# Patient Record
Sex: FEMALE | Race: WHITE | NOT HISPANIC OR LATINO
[De-identification: names, ages, dates, MRNs, and addresses within clinical notes are randomized per-mention and may not be internally consistent; named-entity substitution may affect disease eponyms.]

---

## 2017-04-10 ENCOUNTER — TRANSCRIPTION ENCOUNTER (OUTPATIENT)
Age: 47
End: 2017-04-10

## 2017-09-17 ENCOUNTER — TRANSCRIPTION ENCOUNTER (OUTPATIENT)
Age: 47
End: 2017-09-17

## 2018-02-09 ENCOUNTER — TRANSCRIPTION ENCOUNTER (OUTPATIENT)
Age: 48
End: 2018-02-09

## 2018-02-16 ENCOUNTER — TRANSCRIPTION ENCOUNTER (OUTPATIENT)
Age: 48
End: 2018-02-16

## 2018-07-29 ENCOUNTER — TRANSCRIPTION ENCOUNTER (OUTPATIENT)
Age: 48
End: 2018-07-29

## 2019-06-18 ENCOUNTER — INPATIENT (INPATIENT)
Facility: HOSPITAL | Age: 49
LOS: 1 days | Discharge: HOME | DRG: 517 | End: 2019-06-20
Attending: SPECIALIST | Admitting: SPECIALIST
Payer: COMMERCIAL

## 2019-06-18 ENCOUNTER — TRANSCRIPTION ENCOUNTER (OUTPATIENT)
Age: 49
End: 2019-06-18

## 2019-06-18 VITALS
SYSTOLIC BLOOD PRESSURE: 122 MMHG | TEMPERATURE: 99 F | RESPIRATION RATE: 17 BRPM | DIASTOLIC BLOOD PRESSURE: 81 MMHG | OXYGEN SATURATION: 98 % | WEIGHT: 210.1 LBS | HEIGHT: 67 IN | HEART RATE: 70 BPM

## 2019-06-18 PROCEDURE — 99285 EMERGENCY DEPT VISIT HI MDM: CPT | Mod: 25

## 2019-06-19 DIAGNOSIS — E11.9 TYPE 2 DIABETES MELLITUS WITHOUT COMPLICATIONS: ICD-10-CM

## 2019-06-19 DIAGNOSIS — S82.002A UNSPECIFIED FRACTURE OF LEFT PATELLA, INITIAL ENCOUNTER FOR CLOSED FRACTURE: ICD-10-CM

## 2019-06-19 DIAGNOSIS — I10 ESSENTIAL (PRIMARY) HYPERTENSION: ICD-10-CM

## 2019-06-19 DIAGNOSIS — S52.124A NONDISPLACED FRACTURE OF HEAD OF RIGHT RADIUS, INITIAL ENCOUNTER FOR CLOSED FRACTURE: ICD-10-CM

## 2019-06-19 LAB
ANION GAP SERPL CALC-SCNC: 14 MMOL/L — SIGNIFICANT CHANGE UP (ref 5–17)
APTT BLD: 29.8 SEC — SIGNIFICANT CHANGE UP (ref 27.5–36.3)
BLD GP AB SCN SERPL QL: NEGATIVE — SIGNIFICANT CHANGE UP
BUN SERPL-MCNC: 13 MG/DL — SIGNIFICANT CHANGE UP (ref 7–23)
CALCIUM SERPL-MCNC: 9.5 MG/DL — SIGNIFICANT CHANGE UP (ref 8.4–10.5)
CHLORIDE SERPL-SCNC: 100 MMOL/L — SIGNIFICANT CHANGE UP (ref 96–108)
CO2 SERPL-SCNC: 23 MMOL/L — SIGNIFICANT CHANGE UP (ref 22–31)
CREAT SERPL-MCNC: 0.68 MG/DL — SIGNIFICANT CHANGE UP (ref 0.5–1.3)
GLUCOSE BLDC GLUCOMTR-MCNC: 110 MG/DL — HIGH (ref 70–99)
GLUCOSE BLDC GLUCOMTR-MCNC: 115 MG/DL — HIGH (ref 70–99)
GLUCOSE SERPL-MCNC: 125 MG/DL — HIGH (ref 70–99)
HCG SERPL-ACNC: <2 MIU/ML — SIGNIFICANT CHANGE UP
HCT VFR BLD CALC: 37.8 % — SIGNIFICANT CHANGE UP (ref 34.5–45)
HGB BLD-MCNC: 13 G/DL — SIGNIFICANT CHANGE UP (ref 11.5–15.5)
INR BLD: 1.06 RATIO — SIGNIFICANT CHANGE UP (ref 0.88–1.16)
MCHC RBC-ENTMCNC: 30.2 PG — SIGNIFICANT CHANGE UP (ref 27–34)
MCHC RBC-ENTMCNC: 34.5 GM/DL — SIGNIFICANT CHANGE UP (ref 32–36)
MCV RBC AUTO: 87.6 FL — SIGNIFICANT CHANGE UP (ref 80–100)
PLATELET # BLD AUTO: 436 K/UL — HIGH (ref 150–400)
POTASSIUM SERPL-MCNC: 3.9 MMOL/L — SIGNIFICANT CHANGE UP (ref 3.5–5.3)
POTASSIUM SERPL-MCNC: 5.6 MMOL/L — HIGH (ref 3.5–5.3)
POTASSIUM SERPL-SCNC: 3.9 MMOL/L — SIGNIFICANT CHANGE UP (ref 3.5–5.3)
POTASSIUM SERPL-SCNC: 5.6 MMOL/L — HIGH (ref 3.5–5.3)
PROTHROM AB SERPL-ACNC: 12.2 SEC — SIGNIFICANT CHANGE UP (ref 10–12.9)
RBC # BLD: 4.32 M/UL — SIGNIFICANT CHANGE UP (ref 3.8–5.2)
RBC # FLD: 12.2 % — SIGNIFICANT CHANGE UP (ref 10.3–14.5)
RH IG SCN BLD-IMP: POSITIVE — SIGNIFICANT CHANGE UP
RH IG SCN BLD-IMP: POSITIVE — SIGNIFICANT CHANGE UP
SODIUM SERPL-SCNC: 137 MMOL/L — SIGNIFICANT CHANGE UP (ref 135–145)
WBC # BLD: 12.2 K/UL — HIGH (ref 3.8–10.5)
WBC # FLD AUTO: 12.2 K/UL — HIGH (ref 3.8–10.5)

## 2019-06-19 PROCEDURE — 73080 X-RAY EXAM OF ELBOW: CPT | Mod: 26,RT

## 2019-06-19 PROCEDURE — 73200 CT UPPER EXTREMITY W/O DYE: CPT | Mod: 26,RT

## 2019-06-19 PROCEDURE — 27524 TREAT KNEECAP FRACTURE: CPT | Mod: LT

## 2019-06-19 PROCEDURE — 73564 X-RAY EXAM KNEE 4 OR MORE: CPT | Mod: 26,LT

## 2019-06-19 PROCEDURE — 76377 3D RENDER W/INTRP POSTPROCES: CPT | Mod: 26

## 2019-06-19 PROCEDURE — 73110 X-RAY EXAM OF WRIST: CPT | Mod: 26,RT

## 2019-06-19 PROCEDURE — 24650 CLTX RDL HEAD/NCK FX WO MNPJ: CPT | Mod: 59,RT

## 2019-06-19 PROCEDURE — 93010 ELECTROCARDIOGRAM REPORT: CPT

## 2019-06-19 PROCEDURE — 71045 X-RAY EXAM CHEST 1 VIEW: CPT | Mod: 26

## 2019-06-19 RX ORDER — RAMIPRIL 5 MG
1 CAPSULE ORAL
Qty: 0 | Refills: 0 | DISCHARGE

## 2019-06-19 RX ORDER — LISINOPRIL 2.5 MG/1
20 TABLET ORAL DAILY
Refills: 0 | Status: DISCONTINUED | OUTPATIENT
Start: 2019-06-19 | End: 2019-06-20

## 2019-06-19 RX ORDER — ASPIRIN/CALCIUM CARB/MAGNESIUM 324 MG
81 TABLET ORAL DAILY
Refills: 0 | Status: DISCONTINUED | OUTPATIENT
Start: 2019-06-19 | End: 2019-06-20

## 2019-06-19 RX ORDER — OXYCODONE HYDROCHLORIDE 5 MG/1
5 TABLET ORAL
Refills: 0 | Status: DISCONTINUED | OUTPATIENT
Start: 2019-06-19 | End: 2019-06-20

## 2019-06-19 RX ORDER — DEXTROSE 50 % IN WATER 50 %
15 SYRINGE (ML) INTRAVENOUS ONCE
Refills: 0 | Status: DISCONTINUED | OUTPATIENT
Start: 2019-06-19 | End: 2019-06-20

## 2019-06-19 RX ORDER — CHLORHEXIDINE GLUCONATE 213 G/1000ML
1 SOLUTION TOPICAL ONCE
Refills: 0 | Status: COMPLETED | OUTPATIENT
Start: 2019-06-19 | End: 2019-06-19

## 2019-06-19 RX ORDER — OXYCODONE HYDROCHLORIDE 5 MG/1
5 TABLET ORAL EVERY 4 HOURS
Refills: 0 | Status: DISCONTINUED | OUTPATIENT
Start: 2019-06-19 | End: 2019-06-20

## 2019-06-19 RX ORDER — HYDROMORPHONE HYDROCHLORIDE 2 MG/ML
1 INJECTION INTRAMUSCULAR; INTRAVENOUS; SUBCUTANEOUS EVERY 4 HOURS
Refills: 0 | Status: DISCONTINUED | OUTPATIENT
Start: 2019-06-19 | End: 2019-06-20

## 2019-06-19 RX ORDER — DEXTROSE 50 % IN WATER 50 %
25 SYRINGE (ML) INTRAVENOUS ONCE
Refills: 0 | Status: DISCONTINUED | OUTPATIENT
Start: 2019-06-19 | End: 2019-06-20

## 2019-06-19 RX ORDER — OXYCODONE HYDROCHLORIDE 5 MG/1
10 TABLET ORAL EVERY 4 HOURS
Refills: 0 | Status: DISCONTINUED | OUTPATIENT
Start: 2019-06-19 | End: 2019-06-20

## 2019-06-19 RX ORDER — ACETAMINOPHEN 500 MG
975 TABLET ORAL ONCE
Refills: 0 | Status: COMPLETED | OUTPATIENT
Start: 2019-06-19 | End: 2019-06-19

## 2019-06-19 RX ORDER — GLUCAGON INJECTION, SOLUTION 0.5 MG/.1ML
1 INJECTION, SOLUTION SUBCUTANEOUS ONCE
Refills: 0 | Status: DISCONTINUED | OUTPATIENT
Start: 2019-06-19 | End: 2019-06-20

## 2019-06-19 RX ORDER — SODIUM CHLORIDE 9 MG/ML
1000 INJECTION INTRAMUSCULAR; INTRAVENOUS; SUBCUTANEOUS
Refills: 0 | Status: DISCONTINUED | OUTPATIENT
Start: 2019-06-19 | End: 2019-06-20

## 2019-06-19 RX ORDER — ATORVASTATIN CALCIUM 80 MG/1
1 TABLET, FILM COATED ORAL
Qty: 0 | Refills: 0 | DISCHARGE

## 2019-06-19 RX ORDER — DEXTROSE 50 % IN WATER 50 %
12.5 SYRINGE (ML) INTRAVENOUS ONCE
Refills: 0 | Status: DISCONTINUED | OUTPATIENT
Start: 2019-06-19 | End: 2019-06-20

## 2019-06-19 RX ORDER — OXYCODONE HYDROCHLORIDE 5 MG/1
1 TABLET ORAL
Qty: 12 | Refills: 0
Start: 2019-06-19 | End: 2019-06-21

## 2019-06-19 RX ORDER — CEFAZOLIN SODIUM 1 G
2000 VIAL (EA) INJECTION EVERY 8 HOURS
Refills: 0 | Status: COMPLETED | OUTPATIENT
Start: 2019-06-20 | End: 2019-06-20

## 2019-06-19 RX ORDER — INSULIN LISPRO 100/ML
VIAL (ML) SUBCUTANEOUS EVERY 6 HOURS
Refills: 0 | Status: DISCONTINUED | OUTPATIENT
Start: 2019-06-19 | End: 2019-06-20

## 2019-06-19 RX ORDER — ACETAMINOPHEN 500 MG
650 TABLET ORAL EVERY 6 HOURS
Refills: 0 | Status: DISCONTINUED | OUTPATIENT
Start: 2019-06-19 | End: 2019-06-20

## 2019-06-19 RX ORDER — ATORVASTATIN CALCIUM 80 MG/1
20 TABLET, FILM COATED ORAL AT BEDTIME
Refills: 0 | Status: DISCONTINUED | OUTPATIENT
Start: 2019-06-19 | End: 2019-06-20

## 2019-06-19 RX ORDER — ONDANSETRON 8 MG/1
4 TABLET, FILM COATED ORAL EVERY 6 HOURS
Refills: 0 | Status: DISCONTINUED | OUTPATIENT
Start: 2019-06-19 | End: 2019-06-19

## 2019-06-19 RX ORDER — SODIUM CHLORIDE 9 MG/ML
1000 INJECTION, SOLUTION INTRAVENOUS
Refills: 0 | Status: DISCONTINUED | OUTPATIENT
Start: 2019-06-19 | End: 2019-06-20

## 2019-06-19 RX ORDER — ENOXAPARIN SODIUM 100 MG/ML
40 INJECTION SUBCUTANEOUS DAILY
Refills: 0 | Status: DISCONTINUED | OUTPATIENT
Start: 2019-06-20 | End: 2019-06-20

## 2019-06-19 RX ORDER — ONDANSETRON 8 MG/1
4 TABLET, FILM COATED ORAL EVERY 6 HOURS
Refills: 0 | Status: DISCONTINUED | OUTPATIENT
Start: 2019-06-19 | End: 2019-06-20

## 2019-06-19 RX ORDER — IBUPROFEN 200 MG
600 TABLET ORAL ONCE
Refills: 0 | Status: COMPLETED | OUTPATIENT
Start: 2019-06-19 | End: 2019-06-19

## 2019-06-19 RX ORDER — HYDROMORPHONE HYDROCHLORIDE 2 MG/ML
0.5 INJECTION INTRAMUSCULAR; INTRAVENOUS; SUBCUTANEOUS EVERY 4 HOURS
Refills: 0 | Status: DISCONTINUED | OUTPATIENT
Start: 2019-06-19 | End: 2019-06-20

## 2019-06-19 RX ORDER — DOCUSATE SODIUM 100 MG
100 CAPSULE ORAL THREE TIMES A DAY
Refills: 0 | Status: DISCONTINUED | OUTPATIENT
Start: 2019-06-19 | End: 2019-06-20

## 2019-06-19 RX ADMIN — OXYCODONE HYDROCHLORIDE 10 MILLIGRAM(S): 5 TABLET ORAL at 15:04

## 2019-06-19 RX ADMIN — HYDROMORPHONE HYDROCHLORIDE 1 MILLIGRAM(S): 2 INJECTION INTRAMUSCULAR; INTRAVENOUS; SUBCUTANEOUS at 09:48

## 2019-06-19 RX ADMIN — CHLORHEXIDINE GLUCONATE 1 APPLICATION(S): 213 SOLUTION TOPICAL at 12:40

## 2019-06-19 RX ADMIN — SODIUM CHLORIDE 60 MILLILITER(S): 9 INJECTION INTRAMUSCULAR; INTRAVENOUS; SUBCUTANEOUS at 23:47

## 2019-06-19 RX ADMIN — ONDANSETRON 4 MILLIGRAM(S): 8 TABLET, FILM COATED ORAL at 18:39

## 2019-06-19 RX ADMIN — OXYCODONE HYDROCHLORIDE 10 MILLIGRAM(S): 5 TABLET ORAL at 14:23

## 2019-06-19 RX ADMIN — OXYCODONE HYDROCHLORIDE 10 MILLIGRAM(S): 5 TABLET ORAL at 19:36

## 2019-06-19 RX ADMIN — HYDROMORPHONE HYDROCHLORIDE 1 MILLIGRAM(S): 2 INJECTION INTRAMUSCULAR; INTRAVENOUS; SUBCUTANEOUS at 10:24

## 2019-06-19 RX ADMIN — OXYCODONE HYDROCHLORIDE 10 MILLIGRAM(S): 5 TABLET ORAL at 07:15

## 2019-06-19 RX ADMIN — OXYCODONE HYDROCHLORIDE 10 MILLIGRAM(S): 5 TABLET ORAL at 06:44

## 2019-06-19 RX ADMIN — HYDROMORPHONE HYDROCHLORIDE 1 MILLIGRAM(S): 2 INJECTION INTRAMUSCULAR; INTRAVENOUS; SUBCUTANEOUS at 16:14

## 2019-06-19 RX ADMIN — HYDROMORPHONE HYDROCHLORIDE 1 MILLIGRAM(S): 2 INJECTION INTRAMUSCULAR; INTRAVENOUS; SUBCUTANEOUS at 15:44

## 2019-06-19 RX ADMIN — Medication 81 MILLIGRAM(S): at 12:00

## 2019-06-19 RX ADMIN — OXYCODONE HYDROCHLORIDE 10 MILLIGRAM(S): 5 TABLET ORAL at 20:00

## 2019-06-19 RX ADMIN — SODIUM CHLORIDE 75 MILLILITER(S): 9 INJECTION, SOLUTION INTRAVENOUS at 23:49

## 2019-06-19 RX ADMIN — Medication 975 MILLIGRAM(S): at 03:33

## 2019-06-19 RX ADMIN — Medication 600 MILLIGRAM(S): at 01:25

## 2019-06-19 NOTE — CONSULT NOTE ADULT - SUBJECTIVE AND OBJECTIVE BOX
48 y.o. female history of DM coming in with right elbow pain and left knee pain after a mechanical trip and fall while leaving work 6/18/19 around 6pm.  Did not hit head, no neck or back pain.  Was initially able to bear weight but now unable to walk secondary to pain.  Took Tylenol at 6pm with some relief, any movement of the affected joints makes the pain worse.  History of an ACL repair on the left knee. Patient found to have a left patella fx and a right radius fx. Patient is scheduled for surgical repair of the left patella fx. Patient states pain is well controlled. Denies any hx of CP or shortness of breath.        PAST MEDICAL & SURGICAL HISTORY:  Hypertension  DM  HLD  ACL repair        MEDICATIONS  (STANDING):  aspirin enteric coated 81 milliGRAM(s) Oral daily  atorvastatin 20 milliGRAM(s) Oral at bedtime  chlorhexidine 4% Liquid 1 Application(s) Topical once  dextrose 5%. 1000 milliLiter(s) (50 mL/Hr) IV Continuous <Continuous>  dextrose 50% Injectable 12.5 Gram(s) IV Push once  dextrose 50% Injectable 25 Gram(s) IV Push once  dextrose 50% Injectable 25 Gram(s) IV Push once  insulin lispro (HumaLOG) corrective regimen sliding scale   SubCutaneous every 6 hours  lisinopril 20 milliGRAM(s) Oral daily  sodium chloride 0.9%. 1000 milliLiter(s) (60 mL/Hr) IV Continuous <Continuous>    MEDICATIONS  (PRN):  dextrose 40% Gel 15 Gram(s) Oral once PRN Blood Glucose LESS THAN 70 milliGRAM(s)/deciliter  glucagon  Injectable 1 milliGRAM(s) IntraMuscular once PRN Glucose LESS THAN 70 milligrams/deciliter  HYDROmorphone  Injectable 1 milliGRAM(s) IV Push every 4 hours PRN breakthru pain  oxyCODONE    IR 5 milliGRAM(s) Oral every 3 hours PRN Moderate Pain (4 - 6)  oxyCODONE    IR 10 milliGRAM(s) Oral every 4 hours PRN Severe Pain (7 - 10)    Social Hx:  Tobacco: neg  ETOH:neg  Drugs: neg    Family Hx:  As per my conversation with the patient, non contributory        CONSTITUTIONAL: No weakness, fevers or chills  EYES/ENT: No visual changes;  No vertigo or throat pain   NECK: No pain or stiffness  RESPIRATORY: No cough, wheezing, hemoptysis; No shortness of breath  CARDIOVASCULAR: No chest pain or palpitations  GASTROINTESTINAL: No abdominal or epigastric pain. No nausea, vomiting, or hematemesis; No diarrhea or constipation. No melena or hematochezia.  GENITOURINARY: No dysuria, frequency or hematuria  NEUROLOGICAL: No numbness or weakness  SKIN: No itching, burning, rashes, or lesions   MUSCULOSKELETAL: left leg and right arm pain      INTERVAL HPI/OVERNIGHT EVENTS:  T(C): 37 (06-19-19 @ 08:51), Max: 37 (06-18-19 @ 23:59)  HR: 66 (06-19-19 @ 08:51) (66 - 79)  BP: 132/76 (06-19-19 @ 08:51) (122/81 - 144/77)  RR: 17 (06-19-19 @ 08:51) (16 - 18)  SpO2: 97% (06-19-19 @ 08:51) (97% - 98%)  Wt(kg): --  I&O's Summary    18 Jun 2019 07:01  -  19 Jun 2019 07:00  --------------------------------------------------------  IN: 120 mL / OUT: 0 mL / NET: 120 mL        PHYSICAL EXAM:  GENERAL: NAD, well-groomed, well-developed  HEAD:  Atraumatic, Normocephalic  EYES: EOMI, PERRLA, conjunctiva and sclera clear  ENMT: No tonsillar erythema, exudates, or enlargement; Moist mucous membranes, Good dentition, No lesions  NECK: Supple, No JVD, Normal thyroid  NERVOUS SYSTEM:  Alert & Oriented X3, Good concentration; Motor Strength 5/5 B/L upper and lower extremities; DTRs 2+ intact and symmetric  CHEST/LUNG: Clear to percussion bilaterally; No rales, rhonchi, wheezing, or rubs  HEART: Regular rate and rhythm; No murmurs, rubs, or gallops  ABDOMEN: Soft, Nontender, Nondistended; Bowel sounds present  EXTREMITIES:  2+ Peripheral Pulses, No clubbing, cyanosis, or edema  LYMPH: No lymphadenopathy noted  SKIN: No rashes or lesions        LABS:                        13.0   12.2  )-----------( 436      ( 19 Jun 2019 05:25 )             37.8     06-19    x   |  x   |  x   ----------------------------<  x   3.9   |  x   |  x     Ca    9.5      19 Jun 2019 05:25      PT/INR - ( 19 Jun 2019 05:25 )   PT: 12.2 sec;   INR: 1.06 ratio         PTT - ( 19 Jun 2019 05:25 )  PTT:29.8 sec    CAPILLARY BLOOD GLUCOSE      POCT Blood Glucose.: 110 mg/dL (19 Jun 2019 06:38)    EKG NSR @ 70        Radiology reports:

## 2019-06-19 NOTE — H&P ADULT - ASSESSMENT
A/P: 48 year old woman presenting with L transverse patellar fracture with nonintact knee extensor mechanism and nondisplaced radial head fracture.  On discussion with the patient, she feels uncomfortable going home on crutches as she has to walk upstairs to get into her house and has young kids at home.        - admit to Dr. Jim, orthopedic surgery  - home meds  - pain control  - OR today with Dr. Jim for ORIF L patella  - NPO/IVFs  - preoperative labs

## 2019-06-19 NOTE — ED PROVIDER NOTE - MUSCULOSKELETAL, MLM
Right shoulder and wrist NT full ROM.  Right elbow NT but unable to fully extend.  Left hip and ankle NT full ROM.  Left knee + edema diffusely tender including over the patella.  Unable to passively flex secondary to pain

## 2019-06-19 NOTE — ED PROVIDER NOTE - PROGRESS NOTE DETAILS
xray reveals radial head fx with lucency prox ulna - and ant sail sign,, xr knee with patella fx --ortho paged for eval ELIAS Preciado: ortho at bedside awaiting recs

## 2019-06-19 NOTE — H&P ADULT - NSHPPHYSICALEXAM_GEN_ALL_CORE
EXAM:  Gen: NAD, alert and oriented x3  Resp: no increased WOB on RA    LLE:  Skin intact, no overlying ecchymosis/erythema, moderate swelling about knee  tenderness to palpation over anterior knee  SILT diffusely  TA/GS/EHL/FHL motor intact  Knee extensor mechanism non-intact  2+ PT/DP pulses    RLE:  no tenderness to palpation over femur/hip/knee/tibia/ankle  Full painless ROM of hip/knee/ankle    LUE:   no tenderness to palpation over humerus/shoulder/elbow/wrist  Full painless ROM about shoulder/elbow/wrist  radial pulse palpable    RUE:   Tenderness to palpation over radial head, ROM somewhat limited due to pain  no tenderness to palpation over olecranon, shoulder, wrist  radial pulse palpable

## 2019-06-19 NOTE — H&P ADULT - NSHPLABSRESULTS_GEN_ALL_CORE
Labs:               13.0   12.2  )-----------( 436      ( 19 Jun 2019 05:25 )             37.8     06-19    x   |  x   |  x   ----------------------------<  x   3.9   |  x   |  x     Ca    9.5      19 Jun 2019 05:2    Imaging:    < from: Xray Knee 4 Views, Left (06.19.19 @ 00:58) >    FINDINGS:     Acute comminuted minimally displaced patellar fracture. Large knee joint   effusion. Evidence of prior ACL reconstruction with orthopedic hardware   in the distal femur and proximal tibia. Mild medial compartment joint   spacenarrowing with tricompartmental osteophytosis.    IMPRESSION:     Acute comminuted minimally displaced patellar fracture. Large knee joint   effusion.     < from: Xray Elbow AP + Lateral + Oblique, Right (06.19.19 @ 01:21) >    IMPRESSION:    Acute minimally displaced sagittally oriented fracture through the   central aspect of the radial head. Associated elbow joint effusion.   Question nondisplaced fracture at the base of the coronoid process on the   oblique view.No other fractures. Joint spaces are maintained.    < end of copied text >

## 2019-06-19 NOTE — CONSULT NOTE ADULT - PROBLEM SELECTOR RECOMMENDATION 9
Patient scheduled for Open reduction and internal fixation  No contraindication to scheduled procedure  pain meds as needed  NPO for procedure

## 2019-06-19 NOTE — H&P ADULT - HISTORY OF PRESENT ILLNESS
HPI:   48y Female presenting for L knee and R elbow pain s/p mechanical fall while walking to work.  She denies head strike/LOC.  Localizes pain to the L knee and R elbow, no radiation of pain, no pain in other joints/extremities.

## 2019-06-19 NOTE — ED ADULT NURSE NOTE - OBJECTIVE STATEMENT
[Acute] : an acute visit [Cough] : cough 48 YOF A&Ox3 with pmh of DM presents to ED for right elbow and left knee pain due to a slip and fall earlier on Tuesday. Swelling noted to left knee. Patient denies hitting head or LOC. Patient unable to bear weight due to pain. patient denies sob, chest pain, n/v/d, headaches & blurry vision. safety maintained.

## 2019-06-19 NOTE — ED PROVIDER NOTE - CLINICAL SUMMARY MEDICAL DECISION MAKING FREE TEXT BOX
sherry - pt sp mechanical fall - outstreched hand and onto lt knee - ho acl tear w hardware - wrist nontender - rt elbow from no pain with supination pronation ttp med epicondyle - nvi, knee lachman neg, pos effusion lateral and med jt line ttp ext mech unable to be asses 2/2 to pain cannot exclude qtr - will place in knee immob and crutches /cane and fu ortho sherry - pt sp mechanical fall - outstretched hand and onto lt knee - ho acl tear w hardware - wrist nontender - rt elbow from no pain with supination pronation ttp med epicondyle - nvi, knee Lachman neg, pos effusion lateral and med jt line ttp ext mech unable to be asses 2/2 to pain cannot exclude qtr - will place in knee immob and crutches /cane and fu ortho

## 2019-06-19 NOTE — ED PROVIDER NOTE - NSFOLLOWUPINSTRUCTIONS_ED_ALL_ED_FT
1- Tylenol as needed for pain  2- Oxycodone 5 mg every 8 hours as needed for extreme pain, you can not drive while taking this medication.    3- Follow up with you orthopedist that did the procedure on your knee or Dr Jim  4- Return to ER for any new or worsening symptoms

## 2019-06-19 NOTE — ED PROVIDER NOTE - CARE PLAN
Principal Discharge DX:	Knee injury, initial encounter  Secondary Diagnosis:	Elbow injury, right, initial encounter Principal Discharge DX:	Closed nondisplaced fracture of left patella, unspecified fracture morphology, initial encounter  Secondary Diagnosis:	Elbow injury, right, initial encounter  Secondary Diagnosis:	Closed nondisplaced fracture of head of right radius, initial encounter

## 2019-06-19 NOTE — CONSULT NOTE ADULT - ASSESSMENT
47 yo woman s/p fall presents with a left patella fx and a right radial fx. Patient is scheduled for a Open reduction and internal fixation of the right patella.

## 2019-06-19 NOTE — ED PROVIDER NOTE - SECONDARY DIAGNOSIS.
Elbow injury, right, initial encounter Closed nondisplaced fracture of head of right radius, initial encounter

## 2019-06-19 NOTE — ED ADULT NURSE NOTE - CHPI ED NUR SYMPTOMS NEG
no loss of consciousness/no numbness/no tingling/no vomiting/no confusion/no bleeding/no abrasion/no weakness/no deformity/no fever

## 2019-06-19 NOTE — CHART NOTE - NSCHARTNOTEFT_GEN_A_CORE
Dx: L Patella Fx    Sx: ORIF     Surgeons:  Dr. Jim    Med Cleared: optimization pending eval by HLM    H&P: pending - seen by Dr. Prabhakar Carter (Ortho PGY1)    Vital Signs Last 24 Hrs  T(C): 37 (19 Jun 2019 08:51), Max: 37 (18 Jun 2019 23:59)  T(F): 98.6 (19 Jun 2019 08:51), Max: 98.6 (18 Jun 2019 23:59)  HR: 66 (19 Jun 2019 08:51) (66 - 79)  BP: 132/76 (19 Jun 2019 08:51) (122/81 - 144/77)  BP(mean): --  RR: 17 (19 Jun 2019 08:51) (16 - 18)  SpO2: 97% (19 Jun 2019 08:51) (97% - 98%)                          13.0   12.2  )-----------( 436      ( 19 Jun 2019 05:25 )             37.8   06-19    137  |  100  |  13  ----------------------------<  125<H>  5.6<H>   |  23  |  0.68    Ca    9.5      19 Jun 2019 05:25    PT/INR - ( 19 Jun 2019 05:25 )   PT: 12.2 sec;   INR: 1.06 ratio         PTT - ( 19 Jun 2019 05:25 )  PTT:29.8 sec    **Repeat K+ ordered    Type and Screen: done x2    UA:  ordered    HCG: ordered    EKG: ordered    CXR: ordered      47yo F w/ R Radial Head Fx, R Snuffbox tenderness possible Scaphoid Fx and L Patella Fx. To OR today for ORIF w/ Dr. Jim.     - Pain Control  - NWB LLE in BJKI  - NWB RUE in Sling and wrist splint  - DVT PPx: SCDs, hold all chemical PPx   - NPO except for meds  - IVF while NPO  - Medical Clearance per HLD  - Plan for OR today

## 2019-06-19 NOTE — ED PROVIDER NOTE - OBJECTIVE STATEMENT
48 y.o. female history of DM coming in with right elbow pain and left knee pain after a mechanical trip and fall while leaving work today around 6pm.  Did not hit head, no neck or back pain.  Was initially able to bear weight but now unable to walk secondary to pain.  Took Tylenol at 6pm with some relief, any movement of the affected joints makes the pain worse.  History of an ACL repair on the left knee.

## 2019-06-19 NOTE — ED PROVIDER NOTE - PRINCIPAL DIAGNOSIS
Knee injury, initial encounter Closed nondisplaced fracture of left patella, unspecified fracture morphology, initial encounter

## 2019-06-19 NOTE — ED ADULT NURSE NOTE - NSIMPLEMENTINTERV_GEN_ALL_ED
Implemented All Fall Risk Interventions:  [place] to call system. Call bell, personal items and telephone within reach. Instruct patient to call for assistance. Room bathroom lighting operational. Non-slip footwear when patient is off stretcher. Physically safe environment: no spills, clutter or unnecessary equipment. Stretcher in lowest position, wheels locked, appropriate side rails in place. Provide visual cue, wrist band, yellow gown, etc. Monitor gait and stability. Monitor for mental status changes and reorient to person, place, and time. Review medications for side effects contributing to fall risk. Reinforce activity limits and safety measures with patient and family.

## 2019-06-20 ENCOUNTER — TRANSCRIPTION ENCOUNTER (OUTPATIENT)
Age: 49
End: 2019-06-20

## 2019-06-20 VITALS
TEMPERATURE: 99 F | OXYGEN SATURATION: 98 % | SYSTOLIC BLOOD PRESSURE: 132 MMHG | DIASTOLIC BLOOD PRESSURE: 74 MMHG | RESPIRATION RATE: 18 BRPM | HEART RATE: 82 BPM

## 2019-06-20 LAB
ANION GAP SERPL CALC-SCNC: 14 MMOL/L — SIGNIFICANT CHANGE UP (ref 5–17)
APPEARANCE UR: ABNORMAL
BACTERIA # UR AUTO: NEGATIVE — SIGNIFICANT CHANGE UP
BILIRUB UR-MCNC: NEGATIVE — SIGNIFICANT CHANGE UP
BUN SERPL-MCNC: 9 MG/DL — SIGNIFICANT CHANGE UP (ref 7–23)
CALCIUM SERPL-MCNC: 8.7 MG/DL — SIGNIFICANT CHANGE UP (ref 8.4–10.5)
CHLORIDE SERPL-SCNC: 105 MMOL/L — SIGNIFICANT CHANGE UP (ref 96–108)
CO2 SERPL-SCNC: 22 MMOL/L — SIGNIFICANT CHANGE UP (ref 22–31)
COLOR SPEC: YELLOW — SIGNIFICANT CHANGE UP
CREAT SERPL-MCNC: 0.65 MG/DL — SIGNIFICANT CHANGE UP (ref 0.5–1.3)
DIFF PNL FLD: ABNORMAL
EPI CELLS # UR: 4 /HPF — SIGNIFICANT CHANGE UP (ref 0–5)
GLUCOSE BLDC GLUCOMTR-MCNC: 132 MG/DL — HIGH (ref 70–99)
GLUCOSE BLDC GLUCOMTR-MCNC: 155 MG/DL — HIGH (ref 70–99)
GLUCOSE BLDC GLUCOMTR-MCNC: 189 MG/DL — HIGH (ref 70–99)
GLUCOSE BLDC GLUCOMTR-MCNC: 189 MG/DL — HIGH (ref 70–99)
GLUCOSE SERPL-MCNC: 174 MG/DL — HIGH (ref 70–99)
GLUCOSE UR QL: NEGATIVE — SIGNIFICANT CHANGE UP
HBA1C BLD-MCNC: 5.8 % — HIGH (ref 4–5.6)
HCT VFR BLD CALC: 36 % — SIGNIFICANT CHANGE UP (ref 34.5–45)
HGB BLD-MCNC: 12.3 G/DL — SIGNIFICANT CHANGE UP (ref 11.5–15.5)
HYALINE CASTS # UR AUTO: 2 /LPF — SIGNIFICANT CHANGE UP (ref 0–7)
KETONES UR-MCNC: NEGATIVE — SIGNIFICANT CHANGE UP
LEUKOCYTE ESTERASE UR-ACNC: ABNORMAL
MCHC RBC-ENTMCNC: 30.2 PG — SIGNIFICANT CHANGE UP (ref 27–34)
MCHC RBC-ENTMCNC: 34.3 GM/DL — SIGNIFICANT CHANGE UP (ref 32–36)
MCV RBC AUTO: 88.1 FL — SIGNIFICANT CHANGE UP (ref 80–100)
NITRITE UR-MCNC: NEGATIVE — SIGNIFICANT CHANGE UP
PH UR: 7 — SIGNIFICANT CHANGE UP (ref 5–8)
PLATELET # BLD AUTO: 417 K/UL — HIGH (ref 150–400)
POTASSIUM SERPL-MCNC: 4.1 MMOL/L — SIGNIFICANT CHANGE UP (ref 3.5–5.3)
POTASSIUM SERPL-SCNC: 4.1 MMOL/L — SIGNIFICANT CHANGE UP (ref 3.5–5.3)
PROT UR-MCNC: ABNORMAL
RBC # BLD: 4.08 M/UL — SIGNIFICANT CHANGE UP (ref 3.8–5.2)
RBC # FLD: 12 % — SIGNIFICANT CHANGE UP (ref 10.3–14.5)
RBC CASTS # UR COMP ASSIST: 240 /HPF — HIGH (ref 0–4)
SODIUM SERPL-SCNC: 141 MMOL/L — SIGNIFICANT CHANGE UP (ref 135–145)
SP GR SPEC: 1.02 — SIGNIFICANT CHANGE UP (ref 1.01–1.02)
UROBILINOGEN FLD QL: SIGNIFICANT CHANGE UP
WBC # BLD: 10.5 K/UL — SIGNIFICANT CHANGE UP (ref 3.8–10.5)
WBC # FLD AUTO: 10.5 K/UL — SIGNIFICANT CHANGE UP (ref 3.8–10.5)
WBC UR QL: 178 /HPF — HIGH (ref 0–5)

## 2019-06-20 PROCEDURE — 73200 CT UPPER EXTREMITY W/O DYE: CPT

## 2019-06-20 PROCEDURE — 81001 URINALYSIS AUTO W/SCOPE: CPT

## 2019-06-20 PROCEDURE — 83036 HEMOGLOBIN GLYCOSYLATED A1C: CPT

## 2019-06-20 PROCEDURE — 99285 EMERGENCY DEPT VISIT HI MDM: CPT

## 2019-06-20 PROCEDURE — 73110 X-RAY EXAM OF WRIST: CPT

## 2019-06-20 PROCEDURE — 84702 CHORIONIC GONADOTROPIN TEST: CPT

## 2019-06-20 PROCEDURE — 97161 PT EVAL LOW COMPLEX 20 MIN: CPT

## 2019-06-20 PROCEDURE — 85610 PROTHROMBIN TIME: CPT

## 2019-06-20 PROCEDURE — 86900 BLOOD TYPING SEROLOGIC ABO: CPT

## 2019-06-20 PROCEDURE — 80048 BASIC METABOLIC PNL TOTAL CA: CPT

## 2019-06-20 PROCEDURE — 93005 ELECTROCARDIOGRAM TRACING: CPT

## 2019-06-20 PROCEDURE — 73564 X-RAY EXAM KNEE 4 OR MORE: CPT

## 2019-06-20 PROCEDURE — 76377 3D RENDER W/INTRP POSTPROCES: CPT

## 2019-06-20 PROCEDURE — 82962 GLUCOSE BLOOD TEST: CPT

## 2019-06-20 PROCEDURE — 71045 X-RAY EXAM CHEST 1 VIEW: CPT

## 2019-06-20 PROCEDURE — 76000 FLUOROSCOPY <1 HR PHYS/QHP: CPT

## 2019-06-20 PROCEDURE — 84132 ASSAY OF SERUM POTASSIUM: CPT

## 2019-06-20 PROCEDURE — 85730 THROMBOPLASTIN TIME PARTIAL: CPT

## 2019-06-20 PROCEDURE — 86901 BLOOD TYPING SEROLOGIC RH(D): CPT

## 2019-06-20 PROCEDURE — 86850 RBC ANTIBODY SCREEN: CPT

## 2019-06-20 PROCEDURE — C1713: CPT

## 2019-06-20 PROCEDURE — 85027 COMPLETE CBC AUTOMATED: CPT

## 2019-06-20 PROCEDURE — 73080 X-RAY EXAM OF ELBOW: CPT

## 2019-06-20 RX ORDER — PANTOPRAZOLE SODIUM 20 MG/1
1 TABLET, DELAYED RELEASE ORAL
Qty: 30 | Refills: 0
Start: 2019-06-20 | End: 2019-07-19

## 2019-06-20 RX ORDER — ASPIRIN/CALCIUM CARB/MAGNESIUM 324 MG
1 TABLET ORAL
Qty: 0 | Refills: 0 | DISCHARGE

## 2019-06-20 RX ORDER — ASPIRIN/CALCIUM CARB/MAGNESIUM 324 MG
1 TABLET ORAL
Qty: 84 | Refills: 0
Start: 2019-06-20 | End: 2019-07-31

## 2019-06-20 RX ORDER — INSULIN LISPRO 100/ML
VIAL (ML) SUBCUTANEOUS
Refills: 0 | Status: DISCONTINUED | OUTPATIENT
Start: 2019-06-20 | End: 2019-06-20

## 2019-06-20 RX ORDER — OXYCODONE HYDROCHLORIDE 5 MG/1
1 TABLET ORAL
Qty: 50 | Refills: 0
Start: 2019-06-20

## 2019-06-20 RX ORDER — ACETAMINOPHEN 500 MG
975 TABLET ORAL ONCE
Refills: 0 | Status: COMPLETED | OUTPATIENT
Start: 2019-06-20 | End: 2019-06-20

## 2019-06-20 RX ORDER — METFORMIN HYDROCHLORIDE 850 MG/1
1 TABLET ORAL
Qty: 0 | Refills: 0 | DISCHARGE

## 2019-06-20 RX ORDER — INSULIN LISPRO 100/ML
VIAL (ML) SUBCUTANEOUS AT BEDTIME
Refills: 0 | Status: DISCONTINUED | OUTPATIENT
Start: 2019-06-20 | End: 2019-06-20

## 2019-06-20 RX ORDER — ACETAMINOPHEN 500 MG
2 TABLET ORAL
Qty: 0 | Refills: 0 | DISCHARGE
Start: 2019-06-20

## 2019-06-20 RX ORDER — OXYCODONE HYDROCHLORIDE 5 MG/1
1 TABLET ORAL
Qty: 0 | Refills: 0 | DISCHARGE
Start: 2019-06-20

## 2019-06-20 RX ORDER — DOCUSATE SODIUM 100 MG
1 CAPSULE ORAL
Qty: 0 | Refills: 0 | DISCHARGE
Start: 2019-06-20

## 2019-06-20 RX ADMIN — Medication 100 MILLIGRAM(S): at 05:35

## 2019-06-20 RX ADMIN — OXYCODONE HYDROCHLORIDE 10 MILLIGRAM(S): 5 TABLET ORAL at 16:58

## 2019-06-20 RX ADMIN — OXYCODONE HYDROCHLORIDE 10 MILLIGRAM(S): 5 TABLET ORAL at 12:45

## 2019-06-20 RX ADMIN — Medication 1: at 00:48

## 2019-06-20 RX ADMIN — OXYCODONE HYDROCHLORIDE 10 MILLIGRAM(S): 5 TABLET ORAL at 13:15

## 2019-06-20 RX ADMIN — OXYCODONE HYDROCHLORIDE 10 MILLIGRAM(S): 5 TABLET ORAL at 07:04

## 2019-06-20 RX ADMIN — Medication 81 MILLIGRAM(S): at 12:48

## 2019-06-20 RX ADMIN — Medication 100 MILLIGRAM(S): at 13:57

## 2019-06-20 RX ADMIN — Medication 975 MILLIGRAM(S): at 15:32

## 2019-06-20 RX ADMIN — OXYCODONE HYDROCHLORIDE 10 MILLIGRAM(S): 5 TABLET ORAL at 17:28

## 2019-06-20 RX ADMIN — Medication 1: at 13:59

## 2019-06-20 RX ADMIN — OXYCODONE HYDROCHLORIDE 10 MILLIGRAM(S): 5 TABLET ORAL at 02:25

## 2019-06-20 RX ADMIN — Medication 100 MILLIGRAM(S): at 13:58

## 2019-06-20 RX ADMIN — ENOXAPARIN SODIUM 40 MILLIGRAM(S): 100 INJECTION SUBCUTANEOUS at 12:45

## 2019-06-20 RX ADMIN — ONDANSETRON 4 MILLIGRAM(S): 8 TABLET, FILM COATED ORAL at 06:28

## 2019-06-20 RX ADMIN — OXYCODONE HYDROCHLORIDE 10 MILLIGRAM(S): 5 TABLET ORAL at 06:29

## 2019-06-20 RX ADMIN — OXYCODONE HYDROCHLORIDE 10 MILLIGRAM(S): 5 TABLET ORAL at 02:56

## 2019-06-20 RX ADMIN — Medication 100 MILLIGRAM(S): at 05:36

## 2019-06-20 RX ADMIN — Medication 975 MILLIGRAM(S): at 15:16

## 2019-06-20 RX ADMIN — LISINOPRIL 20 MILLIGRAM(S): 2.5 TABLET ORAL at 05:36

## 2019-06-20 NOTE — DISCHARGE NOTE PROVIDER - NSDCFUADDINST_GEN_ALL_CORE_FT
Dressing will be changed during office visit. Out of bed, ambulate, non weight bearing right wrist and elbow, Left Lower extremity weight bearing as tolerated in knee immobilizer.-

## 2019-06-20 NOTE — PROGRESS NOTE ADULT - ATTENDING COMMENTS
Patient DC ed home   continue current meds  PO as tolerated  activity as tolerated  follow up with ortho

## 2019-06-20 NOTE — PHYSICAL THERAPY INITIAL EVALUATION ADULT - PERTINENT HX OF CURRENT PROBLEM, REHAB EVAL
Pt is a 49yo F admitted s/p fall at work with L patella fx, R scaphoid fx and R radial head fx now POD 1 s/p L patella ORIF.

## 2019-06-20 NOTE — DISCHARGE NOTE PROVIDER - HOSPITAL COURSE
HPI:     48y Female presenting for L knee and R elbow pain s/p mechanical fall while walking to work.  She denies head strike/LOC.  Localizes pain to the L knee and R elbow, no radiation of pain, no pain in other joints/extremities.                                      Review of Systems:    Other Review of Systems: All other review of systems negative, except as noted in HPI            Allergies and Intolerances:          Allergies:    	No Known Allergies:         Home Medications:     * No Current Medications as of 19-Jun-2019 00:45 documented in Structured Notes    .        Patient History:      Past Medical, Past Surgical, and Family History:    PAST MEDICAL HISTORY:    Hypertension.         No significant past surgical history.        6/18/19- Patient presents to the hospital after a mechanical fall with complaint of right elbow and left patella fracture, x-ray reveals right radial head fracture and left patella fracture. Patient was admitted and medically cleared for surgery.    6/19/19- Patient taken to surgery underwent a left patella ORIF- tolerated procedure without complication.    Patient was evaluated by Physical therapy whom recommended home for discharge plan.    Patient is stable for discharge when cleared by PT.

## 2019-06-20 NOTE — DISCHARGE NOTE PROVIDER - NSDCCPCAREPLAN_GEN_ALL_CORE_FT
PRINCIPAL DISCHARGE DIAGNOSIS  Diagnosis: Closed nondisplaced fracture of left patella, unspecified fracture morphology, initial encounter  Assessment and Plan of Treatment:       SECONDARY DISCHARGE DIAGNOSES  Diagnosis: Closed nondisplaced fracture of head of right radius, initial encounter  Assessment and Plan of Treatment:     Diagnosis: Elbow injury, right, initial encounter  Assessment and Plan of Treatment:

## 2019-06-20 NOTE — DISCHARGE NOTE NURSING/CASE MANAGEMENT/SOCIAL WORK - NSDCDPATPORTLINK_GEN_ALL_CORE
You can access the [facility] Patient Portal, offered by [facility], by registering with the following website: [email or web address]

## 2019-06-20 NOTE — PROGRESS NOTE ADULT - SUBJECTIVE AND OBJECTIVE BOX
Post-op Check    Pt seen and examined at bedside after stable transfer to PACU. Pain well controlled. Emesis x2 relieved by Zofran. Denies CP/Dyspnea/Calf tenderness bilaterally.    Vital Signs Last 24 Hrs  T(C): 36.2 (20 Jun 2019 00:45), Max: 37.2 (19 Jun 2019 16:48)  T(F): 97.2 (20 Jun 2019 00:45), Max: 99 (19 Jun 2019 16:48)  HR: 80 (20 Jun 2019 01:30) (66 - 90)  BP: 136/74 (20 Jun 2019 01:30) (127/82 - 180/72)  BP(mean): 99 (20 Jun 2019 01:30) (97 - 121)  RR: 16 (20 Jun 2019 01:30) (14 - 18)  SpO2: 97% (20 Jun 2019 01:30) (94% - 98%)    PE     LLE:  dressing overlying knee c/d/i   BJKI in place  b/l calf NTTP bilaterally  +ehl/fhl/gs/ta  SILT L2-S1  +DP/PT  compartments soft    RUE  sling and wrist splint in place  wiggles and senses digits 1-5  +snuff box tenderness
Patient is a 48y old  Female who presents with a chief complaint of patella fx (19 Jun 2019 11:21)      POST OPERATIVE DAY #:  1  Patient comfortable  No complaints    T(C): 37.1 (06-20-19 @ 05:15), Max: 37.2 (06-19-19 @ 16:48)  HR: 85 (06-20-19 @ 05:15) (66 - 90)  BP: 128/73 (06-20-19 @ 05:15) (108/67 - 180/72)  RR: 18 (06-20-19 @ 05:15) (14 - 18)  SpO2: 95% (06-20-19 @ 05:15) (94% - 98%)  Wt(kg): --    PHYSICAL EXAM:  NAD, Alert  EXT:  RUE  splint on  rt hand neurovascular intact  ROM elbow with slight discomfort  LLE:Dressing C/D/I in knee immobilizer  (+) DF/PF;  EHL FHL:  No gross Sensation deficits noted   (+) Distal Pulses;   B/L, PAS     LABS:                        13.0   12.2<H> )-----------( 436<H>    ( 19 Jun 2019 05:25 )             37.8     06-19    x   |  x   |  x   ----------------------------<  x   3.9   |  x   |  x       PT/INR - ( 19 Jun 2019 05:25 )   PT: 12.2 sec;   INR: 1.06 ratio         PTT - ( 19 Jun 2019 05:25 )  PTT:29.8 sec
48 y.o. female history of DM coming in with right elbow pain and left knee pain after a mechanical trip and fall while leaving work 6/18/19 around 6pm.  Did not hit head, no neck or back pain.  Was initially able to bear weight but now unable to walk secondary to pain.  Took Tylenol at 6pm with some relief, any movement of the affected joints makes the pain worse.  History of an ACL repair on the left knee. Patient found to have a left patella fx and a right radius fx. Patient is scheduled for surgical repair of the left patella fx. Patient states pain is well controlled. Denies any hx of CP or shortness of breath.        MEDICATIONS  (STANDING):  aspirin enteric coated 81 milliGRAM(s) Oral daily  atorvastatin 20 milliGRAM(s) Oral at bedtime  chlorhexidine 4% Liquid 1 Application(s) Topical once  dextrose 5%. 1000 milliLiter(s) (50 mL/Hr) IV Continuous <Continuous>  dextrose 50% Injectable 12.5 Gram(s) IV Push once  dextrose 50% Injectable 25 Gram(s) IV Push once  dextrose 50% Injectable 25 Gram(s) IV Push once  insulin lispro (HumaLOG) corrective regimen sliding scale   SubCutaneous every 6 hours  lisinopril 20 milliGRAM(s) Oral daily  sodium chloride 0.9%. 1000 milliLiter(s) (60 mL/Hr) IV Continuous <Continuous>    MEDICATIONS  (PRN):  dextrose 40% Gel 15 Gram(s) Oral once PRN Blood Glucose LESS THAN 70 milliGRAM(s)/deciliter  glucagon  Injectable 1 milliGRAM(s) IntraMuscular once PRN Glucose LESS THAN 70 milligrams/deciliter  HYDROmorphone  Injectable 1 milliGRAM(s) IV Push every 4 hours PRN breakthru pain  oxyCODONE    IR 5 milliGRAM(s) Oral every 3 hours PRN Moderate Pain (4 - 6)  oxyCODONE    IR 10 milliGRAM(s) Oral every 4 hours PRN Severe Pain (7 - 10)        CONSTITUTIONAL: No weakness, fevers or chills  EYES/ENT: No visual changes;  No vertigo or throat pain   NECK: No pain or stiffness  RESPIRATORY: No cough, wheezing, hemoptysis; No shortness of breath  CARDIOVASCULAR: No chest pain or palpitations  GASTROINTESTINAL: No abdominal or epigastric pain. No nausea, vomiting, or hematemesis; No diarrhea or constipation. No melena or hematochezia.  GENITOURINARY: No dysuria, frequency or hematuria  NEUROLOGICAL: No numbness or weakness  SKIN: No itching, burning, rashes, or lesions   MUSCULOSKELETAL: left leg and right arm pain      INTERVAL HPI/OVERNIGHT EVENTS:  T(C): 37 (06-19-19 @ 08:51), Max: 37 (06-18-19 @ 23:59)  HR: 66 (06-19-19 @ 08:51) (66 - 79)  BP: 132/76 (06-19-19 @ 08:51) (122/81 - 144/77)  RR: 17 (06-19-19 @ 08:51) (16 - 18)  SpO2: 97% (06-19-19 @ 08:51) (97% - 98%)  Wt(kg): --  I&O's Summary    18 Jun 2019 07:01  -  19 Jun 2019 07:00  --------------------------------------------------------  IN: 120 mL / OUT: 0 mL / NET: 120 mL        PHYSICAL EXAM:  GENERAL: NAD, well-groomed, well-developed  HEAD:  Atraumatic, Normocephalic  EYES: EOMI, PERRLA, conjunctiva and sclera clear  ENMT: No tonsillar erythema, exudates, or enlargement; Moist mucous membranes, Good dentition, No lesions  NECK: Supple, No JVD, Normal thyroid  NERVOUS SYSTEM:  Alert & Oriented X3, Good concentration; Motor Strength 5/5 B/L upper and lower extremities; DTRs 2+ intact and symmetric  CHEST/LUNG: Clear to percussion bilaterally; No rales, rhonchi, wheezing, or rubs  HEART: Regular rate and rhythm; No murmurs, rubs, or gallops  ABDOMEN: Soft, Nontender, Nondistended; Bowel sounds present  EXTREMITIES:  2+ Peripheral Pulses, No clubbing, cyanosis, or edema  LYMPH: No lymphadenopathy noted  SKIN: No rashes or lesions        LABS:                        13.0   12.2  )-----------( 436      ( 19 Jun 2019 05:25 )             37.8     06-19    x   |  x   |  x   ----------------------------<  x   3.9   |  x   |  x     Ca    9.5      19 Jun 2019 05:25      PT/INR - ( 19 Jun 2019 05:25 )   PT: 12.2 sec;   INR: 1.06 ratio         PTT - ( 19 Jun 2019 05:25 )  PTT:29.8 sec    CAPILLARY BLOOD GLUCOSE      POCT Blood Glucose.: 110 mg/dL (19 Jun 2019 06:38)    EKG NSR @ 70        Radiology reports:

## 2019-06-20 NOTE — DISCHARGE NOTE PROVIDER - NSDCFUADDAPPT_GEN_ALL_CORE_FT
Please call Dr. Jim' s office within next few days to schedule a follow up appointment about 14 days after surgery.  Recommend follow up with medical MD, within next 4 weeks.

## 2019-06-20 NOTE — DISCHARGE NOTE PROVIDER - NSDCACTIVITY_GEN_ALL_CORE
Do not make important decisions/Do not drive or operate machinery/Stairs allowed/Walking - Outdoors allowed/Walking - Indoors allowed/No heavy lifting/straining

## 2019-06-20 NOTE — DISCHARGE NOTE PROVIDER - CARE PROVIDER_API CALL
Juan F Jim)  Orthopaedic Surgery  [address]  [place] [address]  Phone: [phone]  Fax: [phone]  Follow Up Time:

## 2019-06-21 PROBLEM — I10 ESSENTIAL (PRIMARY) HYPERTENSION: Chronic | Status: ACTIVE | Noted: 2019-06-19

## 2019-06-24 PROBLEM — Z00.00 ENCOUNTER FOR PREVENTIVE HEALTH EXAMINATION: Status: ACTIVE | Noted: 2019-06-24

## 2019-07-01 ENCOUNTER — APPOINTMENT (OUTPATIENT)
Dept: ORTHOPEDIC SURGERY | Facility: CLINIC | Age: 49
End: 2019-07-01
Payer: OTHER MISCELLANEOUS

## 2019-07-01 VITALS — BODY MASS INDEX: 32.18 KG/M2 | HEIGHT: 67 IN | WEIGHT: 205 LBS

## 2019-07-01 DIAGNOSIS — Z86.39 PERSONAL HISTORY OF OTHER ENDOCRINE, NUTRITIONAL AND METABOLIC DISEASE: ICD-10-CM

## 2019-07-01 DIAGNOSIS — M25.531 PAIN IN RIGHT WRIST: ICD-10-CM

## 2019-07-01 DIAGNOSIS — S62.024A NONDISPLACED FRACTURE OF MIDDLE THIRD OF NAVICULAR [SCAPHOID] BONE OF RIGHT WRIST, INITIAL ENCOUNTER FOR CLOSED FRACTURE: ICD-10-CM

## 2019-07-01 DIAGNOSIS — Z86.79 PERSONAL HISTORY OF OTHER DISEASES OF THE CIRCULATORY SYSTEM: ICD-10-CM

## 2019-07-01 DIAGNOSIS — Z80.9 FAMILY HISTORY OF MALIGNANT NEOPLASM, UNSPECIFIED: ICD-10-CM

## 2019-07-01 PROCEDURE — 29075 APPL CST ELBW FNGR SHORT ARM: CPT | Mod: 58,RT

## 2019-07-01 PROCEDURE — 73110 X-RAY EXAM OF WRIST: CPT | Mod: RT

## 2019-07-01 PROCEDURE — 99024 POSTOP FOLLOW-UP VISIT: CPT

## 2019-07-01 PROCEDURE — 73080 X-RAY EXAM OF ELBOW: CPT | Mod: RT

## 2019-07-01 PROCEDURE — 73560 X-RAY EXAM OF KNEE 1 OR 2: CPT | Mod: LT

## 2019-07-01 RX ORDER — PEN NEEDLE, DIABETIC 32 GX 1/4"
32G X 6 MM NEEDLE, DISPOSABLE MISCELLANEOUS
Qty: 90 | Refills: 0 | Status: ACTIVE | COMMUNITY
Start: 2019-02-01

## 2019-07-01 RX ORDER — ATORVASTATIN CALCIUM 20 MG/1
20 TABLET, FILM COATED ORAL
Qty: 90 | Refills: 0 | Status: ACTIVE | COMMUNITY
Start: 2019-02-01

## 2019-07-01 RX ORDER — OXYCODONE 5 MG/1
5 TABLET ORAL
Qty: 50 | Refills: 0 | Status: ACTIVE | COMMUNITY
Start: 2019-06-20

## 2019-07-01 RX ORDER — LANCETS 30 GAUGE
EACH MISCELLANEOUS
Qty: 200 | Refills: 0 | Status: ACTIVE | COMMUNITY
Start: 2019-02-01

## 2019-07-01 RX ORDER — METFORMIN HYDROCHLORIDE 1000 MG/1
1000 TABLET, COATED ORAL
Qty: 180 | Refills: 0 | Status: ACTIVE | COMMUNITY
Start: 2019-02-01

## 2019-07-01 RX ORDER — LIRAGLUTIDE 6 MG/ML
18 INJECTION SUBCUTANEOUS
Qty: 18 | Refills: 0 | Status: ACTIVE | COMMUNITY
Start: 2019-02-01

## 2019-07-01 RX ORDER — PANTOPRAZOLE 40 MG/1
40 TABLET, DELAYED RELEASE ORAL
Qty: 30 | Refills: 0 | Status: ACTIVE | COMMUNITY
Start: 2019-06-20

## 2019-07-01 RX ORDER — BLOOD SUGAR DIAGNOSTIC
STRIP MISCELLANEOUS
Qty: 150 | Refills: 0 | Status: ACTIVE | COMMUNITY
Start: 2019-02-01

## 2019-07-01 RX ORDER — RAMIPRIL 5 MG/1
5 CAPSULE ORAL
Qty: 30 | Refills: 0 | Status: ACTIVE | COMMUNITY
Start: 2019-02-12

## 2019-07-02 PROBLEM — M25.531 RIGHT WRIST PAIN: Status: ACTIVE | Noted: 2019-07-01

## 2019-07-02 PROBLEM — S62.024A CLOSED NONDISPLACED FRACTURE OF MIDDLE THIRD OF SCAPHOID BONE OF RIGHT WRIST, INITIAL ENCOUNTER: Status: ACTIVE | Noted: 2019-07-02

## 2019-07-02 NOTE — ADDENDUM
[FreeTextEntry1] : I, Lynda Fish wrote this note acting as a scribe for Dr. Juan F Jim on Jul 01, 2019.

## 2019-07-02 NOTE — RETURN TO WORK/SCHOOL
[FreeTextEntry1] : To whom this may concern, \par \par Ms. HAYLEE THRASHER was seen in the office today on 07/01/2019. Please be advised that due to the nature of her injuries, she will be out of work until further notice. \par \par Juan F Jim MD \par \par

## 2019-07-02 NOTE — END OF VISIT
[FreeTextEntry3] : I, Juan F Jim MD, ordering physician, have read and attest that all the information, medical decision making and discharge instructions within are true and accurate.

## 2019-07-02 NOTE — HISTORY OF PRESENT ILLNESS
[Clean/Dry/Intact] : clean, dry and intact [Doing Well] : is doing well [Excellent Pain Control] : has excellent pain control [No Sign of Infection] : is showing no signs of infection [None] : None [Chills] : no chills [Fever] : no fever [Nausea] : no nausea [Vomiting] : no vomiting [Erythema] : not erythematous [de-identified] : s/p left patella ORIF 6/19/19 [de-identified] : The patient returns for the 1st postoperative visit after undergoing ORIF left patella at [facility]. Patient presented to the ER c/o left knee and right elbow pain after she fell at work. Xrays taken showed a displaced inferior pole patellar fracture and nondisplaced radial head fracture. She was advised surgery treatment for the patella. The surgery was on 06/19/2019. She reports mild postoperative pain. The patient is recovering at home. She has been WBAT in a knee immobilizer brace and cane. She wears a rigid wrist brace on the right wrist. She states that additional xrays of the right wrist were done in the hospital and she was told that there was an additional fracture in the wrist. Patient presents today with two female acquittances.  [de-identified] : Patient is WDWN, alert, and in no acute distress. Breathing is unlabored. She is grossly oriented to person, place, and time. \par \par Left Knee: Knee immobilizer brace was removed for examination. Incision is healed. There are no signs of infection. Dissolvable sutures with steri strips are in place. Edema and tenderness are present. \par Range of Motion: 0-40 °. \par \par Right Elbow: 0-120 °. Full supination and pronation .\par \par Right Wrist: Tenderness over the anatomical snuffbox.  [de-identified] : AP, lateral and oblique views of the right elbow were obtained today and revealed a nondisplaced radial head fracture. \par \par 2 views of the left knee were obtained and revealed a well-aligned patellar fracture with K-wire and figure-of-eight tension band wiring. \par \par AP, lateral and oblique views of the right wrist were obtained today and revealed a nondisplaced scaphoid fracture.  [de-identified] : A gortex-lined short arm cast was applied. Cast care instructions were reviewed. The patient wishes to proceed with physical therapy. A script was given. This is an official Worker's Compensation request. Continue with the knee immobilizer. Gentle ROM exercises in the cast were encouraged. NSAIDs as tolerated. A work note was provided. \par Follow up in 4 weeks for cast removal and repeat x-rays.

## 2019-07-02 NOTE — HISTORY OF PRESENT ILLNESS
[Clean/Dry/Intact] : clean, dry and intact [Doing Well] : is doing well [Excellent Pain Control] : has excellent pain control [No Sign of Infection] : is showing no signs of infection [None] : None [Fever] : no fever [Chills] : no chills [Nausea] : no nausea [Vomiting] : no vomiting [Erythema] : not erythematous [de-identified] : s/p left patella ORIF 6/19/19 [de-identified] : The patient returns for the 1st postoperative visit after undergoing ORIF left patella at [facility]. Patient presented to the ER c/o left knee and right elbow pain after she fell at work. Xrays taken showed a displaced inferior pole patellar fracture and nondisplaced radial head fracture. She was advised surgery treatment for the patella. The surgery was on 06/19/2019. She reports mild postoperative pain. The patient is recovering at home. She has been WBAT in a knee immobilizer brace and cane. She wears a rigid wrist brace on the right wrist. She states that additional xrays of the right wrist were done in the hospital and she was told that there was an additional fracture in the wrist. Patient presents today with two female acquittances.  [de-identified] : Patient is WDWN, alert, and in no acute distress. Breathing is unlabored. She is grossly oriented to person, place, and time. \par \par Left Knee: Knee immobilizer brace was removed for examination. Incision is healed. There are no signs of infection. Dissolvable sutures with steri strips are in place. Edema and tenderness are present. \par Range of Motion: 0-40 °. \par \par Right Elbow: 0-120 °. Full supination and pronation .\par \par Right Wrist: Tenderness over the anatomical snuffbox.  [de-identified] : AP, lateral and oblique views of the right elbow were obtained today and revealed a nondisplaced radial head fracture. \par \par 2 views of the left knee were obtained and revealed a well-aligned patellar fracture with K-wire and figure-of-eight tension band wiring. \par \par AP, lateral and oblique views of the right wrist were obtained today and revealed a nondisplaced scaphoid fracture.  [de-identified] : A gortex-lined short arm cast was applied. Cast care instructions were reviewed. The patient wishes to proceed with physical therapy. A script was given. This is an official Worker's Compensation request. Continue with the knee immobilizer. Gentle ROM exercises in the cast were encouraged. NSAIDs as tolerated. A work note was provided. \par Follow up in 4 weeks for cast removal and repeat x-rays.

## 2019-07-14 NOTE — PRE-ANESTHESIA EVALUATION ADULT - NSANTHPMHFT_GEN_ALL_CORE
47 yo F.  S/p mechanical fall w/ ortho/traumatic injury, including R Radial Head Fx, R Snuffbox tenderness possible Scaphoid Fx and L Patella Fx.  To OR today for ORIF w/ Dr. Jim.   Pmhx DM HTN  No CAD CHF CKD CVA. No CP or SOB 0

## 2019-08-05 ENCOUNTER — APPOINTMENT (OUTPATIENT)
Dept: ORTHOPEDIC SURGERY | Facility: CLINIC | Age: 49
End: 2019-08-05
Payer: OTHER MISCELLANEOUS

## 2019-08-05 VITALS — HEIGHT: 67 IN | WEIGHT: 205 LBS | BODY MASS INDEX: 32.18 KG/M2

## 2019-08-05 PROCEDURE — 99024 POSTOP FOLLOW-UP VISIT: CPT

## 2019-08-12 ENCOUNTER — APPOINTMENT (OUTPATIENT)
Dept: ORTHOPEDIC SURGERY | Facility: CLINIC | Age: 49
End: 2019-08-12
Payer: OTHER MISCELLANEOUS

## 2019-08-12 PROCEDURE — 73110 X-RAY EXAM OF WRIST: CPT | Mod: RT

## 2019-08-12 PROCEDURE — 73080 X-RAY EXAM OF ELBOW: CPT | Mod: RT

## 2019-08-12 PROCEDURE — 99024 POSTOP FOLLOW-UP VISIT: CPT

## 2019-08-12 PROCEDURE — 73560 X-RAY EXAM OF KNEE 1 OR 2: CPT | Mod: LT

## 2019-08-15 NOTE — ADDENDUM
[FreeTextEntry1] : I, Lynda Fish wrote this note acting as a scribe for Dr. Juan F Jim on Aug 12, 2019.

## 2019-08-15 NOTE — HISTORY OF PRESENT ILLNESS
[Clean/Dry/Intact] : clean, dry and intact [Doing Well] : is doing well [Excellent Pain Control] : has excellent pain control [No Sign of Infection] : is showing no signs of infection [None] : None [Chills] : no chills [Fever] : no fever [Nausea] : no nausea [Vomiting] : no vomiting [Erythema] : not erythematous [de-identified] : s/p left patella ORIF 6/19/19 [de-identified] : The patient returns for the 2nd postoperative visit after undergoing ORIF left patella at [facility]. Patient presented to the ER c/o left knee and right elbow pain after she fell at work. Xrays taken showed a displaced inferior pole patellar fracture and nondisplaced radial head fracture. She was advised surgery treatment for the patella. The surgery was on 06/19/2019. She reports mild postoperative pain. The patient is recovering at home. She has been WBAT in a knee immobilizer brace and cane. She states that additional xrays of the right wrist were done in the hospital and she was told that there was an additional fracture in the wrist. Xrays of the left wrist taken in this office on 07/01/2019 revealed a nondisplaced scaphoid fracture. She was placed into a short arm cast. She also sustained a right elbow injury with xrays revealed a nondisplaced radial head fracture. She has been doing PT following her last visit. She is concerned of the appearance of the left knee incision and states that it has "opened up". She has been cleaning it with hydrogen peroxide out of precaution. She denies N/V/D. [de-identified] : Patient is WDWN, alert, and in no acute distress. Breathing is unlabored. She is grossly oriented to person, place, and time. \par \par Right Elbow: 0-120 °. Full supination and pronation .\par \par Right Wrist: SAC was removed. Residual tenderness over the anatomical snuffbox. \par \par Left Knee: Knee immobilizer brace was removed for examination. Incision is healing. There are no signs of infection. Edema and tenderness are present. \par Range of Motion: 0-40 °.  [de-identified] : AP, lateral and oblique views of the right elbow were obtained today and revealed a nondisplaced radial head fracture. \par \par AP, lateral and oblique views of the right wrist were obtained today and revealed a nondisplaced scaphoid fracture. \par \par 2 views of the left knee were obtained and revealed a well-aligned patellar fracture with K-wire and figure-of-eight tension band wiring.  [de-identified] : Continue with PT. A new script was provided. This is an official Worker's Compensation request. Continue with the knee immobilizer. She was advised to obtain a rigid wrist support brace for the right hand for the interim. Gentle ROM exercises were encouraged. NSAIDs as tolerated.\par Follow up in 6 weeks for repeat x-rays.

## 2019-08-15 NOTE — RETURN TO WORK/SCHOOL
[FreeTextEntry1] : Connie Goins was seen in the office today.  She may not travel to work at this time but she may work remotely from home starting September 1, 2019 until further notice. Patient is unable to walk longer than half of a block, she is unable to push, pull, carry with the right upper extremity. At this point in time, plan of treatment is to continue with therapy 2-3 times per week to regain strength and range of motion. She will be re-evaluated in 6 weeks.

## 2019-09-23 ENCOUNTER — APPOINTMENT (OUTPATIENT)
Dept: ORTHOPEDIC SURGERY | Facility: CLINIC | Age: 49
End: 2019-09-23
Payer: OTHER MISCELLANEOUS

## 2019-09-23 PROCEDURE — 73080 X-RAY EXAM OF ELBOW: CPT | Mod: RT

## 2019-09-23 PROCEDURE — 73110 X-RAY EXAM OF WRIST: CPT | Mod: RT

## 2019-09-23 PROCEDURE — 73560 X-RAY EXAM OF KNEE 1 OR 2: CPT | Mod: LT

## 2019-09-23 PROCEDURE — 99214 OFFICE O/P EST MOD 30 MIN: CPT

## 2019-09-23 NOTE — HISTORY OF PRESENT ILLNESS
[Clean/Dry/Intact] : clean, dry and intact [Doing Well] : is doing well [Excellent Pain Control] : has excellent pain control [No Sign of Infection] : is showing no signs of infection [None] : None [Chills] : no chills [Fever] : no fever [Nausea] : no nausea [Vomiting] : no vomiting [Erythema] : not erythematous [de-identified] : s/p left patella ORIF 6/19/19 [de-identified] : The patient returns for the 3rd postoperative visit after undergoing ORIF left patella at [facility]. Patient presented to the ER c/o left knee and right elbow pain after she fell at work. Xrays taken showed a displaced inferior pole patellar fracture and nondisplaced radial head fracture. She was advised surgery treatment for the patella. The surgery was on 06/19/2019.  She states that additional xrays of the right wrist were done in the hospital and she was told that there was an additional fracture in the wrist. Xrays of the left wrist taken in this office on 07/01/2019 revealed a nondisplaced scaphoid fracture. She was treated with a short arm cast. She also sustained a right elbow injury with xrays revealed a nondisplaced radial head fracture. She reports minimal postoperative pain. She has been FWB in a knee immobilizer brace. She is no longer using the cane. She has been attending PT for the knee and wrist x 3 week. The knee occasionally tim. There is also\par intermittent pain above the knee along the quadriceps tendon. It is not related to her physical activity. She has been working remotely from home.  [de-identified] : Continue with PT. Continue with the knee immobilizer brace. Gentle ROM exercises were encouraged. NSAIDs as tolerated. A note for work was provided. \par Follow up in 6 weeks for repeat x-rays.  [de-identified] : Patient is WDWN, alert, and in no acute distress. Breathing is unlabored. She is grossly oriented to person, place, and time. \par \par Right Elbow: 0-120 °. Full supination and pronation .\par \par Right Wrist: No tenderness over the anatomical snuffbox. \par \par Left Knee: Knee immobilizer brace was removed for examination. Incision is healing. There are no signs of infection. Edema and tenderness are present. \par Range of Motion: 0-130 °.  [de-identified] : AP, lateral and oblique views of the right elbow were obtained today and revealed a nondisplaced radial head fracture. Fx is fully healed. \par \par AP, lateral and oblique views of the right wrist were obtained today and revealed a nondisplaced scaphoid fracture. Fx is fully healed. \par \par 2 views of the left knee were obtained and revealed a well-aligned patellar fracture with K-wire and figure-of-eight tension band wiring. Fx is fully healed.

## 2019-09-23 NOTE — RETURN TO WORK/SCHOOL
[FreeTextEntry1] : Ms. HAYLEE THRASHER was seen in the office today on 09/23/2019 and evaluated by me for an Orthopedic postoperative visit. Please be advised that due to the nature of her multiple injuries she will be required to continue to work from home until further notice. She will return in 8 weeks for reevaluation. \par \par Sincerely,

## 2019-09-23 NOTE — ADDENDUM
[FreeTextEntry1] : I, Lynda Fish wrote this note acting as a scribe for Dr. Juan F Jim on Sep 23, 2019.

## 2019-10-14 ENCOUNTER — CHART COPY (OUTPATIENT)
Age: 49
End: 2019-10-14

## 2019-11-25 ENCOUNTER — APPOINTMENT (OUTPATIENT)
Dept: ORTHOPEDIC SURGERY | Facility: CLINIC | Age: 49
End: 2019-11-25
Payer: OTHER MISCELLANEOUS

## 2019-11-25 PROCEDURE — 73080 X-RAY EXAM OF ELBOW: CPT | Mod: RT

## 2019-11-25 PROCEDURE — 73564 X-RAY EXAM KNEE 4 OR MORE: CPT | Mod: LT

## 2019-11-25 PROCEDURE — 73110 X-RAY EXAM OF WRIST: CPT | Mod: RT

## 2019-11-25 PROCEDURE — 99213 OFFICE O/P EST LOW 20 MIN: CPT

## 2019-11-25 NOTE — PHYSICAL EXAM
[de-identified] : AP, lateral and oblique views of the right elbow were obtained today and revealed a nondisplaced radial head fracture. Fx is fully healed. \par \par AP, lateral and oblique views of the right wrist were obtained today and revealed a nondisplaced scaphoid fracture. Fx is fully healed. \par \par 2 views of the left knee were obtained and revealed a well-aligned patellar fracture with K-wire and figure-of-eight tension band wiring. Fx is fully healed. \par  [de-identified] : Patient is WDWN, alert, and in no acute distress. Breathing is unlabored. She is grossly oriented to person, place, and time. \par \par Right Elbow: 0-120 °. Full supination and pronation.\par \par Right Wrist: No tenderness over the anatomical snuffbox. \par \par Left Knee: Incision is healing. There are no signs of infection. The surgical incision site(s) was clean, dry and intact and not erythematous.  Minimal tenderness present. No edema. \par Range of Motion: Full flexion and extension.

## 2019-11-25 NOTE — ADDENDUM
[FreeTextEntry1] : I, Lynda Fish wrote this note acting as a scribe for Dr. Juan F Jim on Nov 25, 2019.

## 2019-11-25 NOTE — DISCUSSION/SUMMARY
[de-identified] : Continue with PT. \par Range of motion and strengthening exercises were reviewed. \par Topical analgesics as needed. \par NSAIDs as tolerated. \par Follow up in 6 months for repeat x-rays. \par \par Activity Restrictions: None. \par

## 2019-11-25 NOTE — HISTORY OF PRESENT ILLNESS
[de-identified] : Patient is a 49 year old female who returns for a followup visit after undergoing ORIF left patella at [facility]. Patient presented to the ER c/o left knee and right elbow pain after she fell at work. Xrays taken showed a displaced inferior pole patellar fracture and nondisplaced radial head fracture. She was advised surgery treatment for the patella. The surgery was on 06/19/2019. She states that additional xrays of the right wrist were done in the hospital and she was told that there was an additional fracture in the wrist. Xrays of the left wrist taken in this office on 07/01/2019 revealed a nondisplaced scaphoid fracture. She was treated with a short arm cast. She also sustained a right elbow injury with xrays revealed a nondisplaced radial head fracture. Today, she reports minimal postoperative pain. The knee feels stiff and sore. She is no longer using the cane. She has been attending PT for the knee and wrist x 3 week. The knee occasionally tim. There is also intermittent pain above the knee along the quadriceps tendon. It is not related to her physical activity. The knee also occasionally feels warm, although she denies any periods of discoloration. She has returned to work and is commuting. Regarding the right elbow and wrist, she does not report any pain or new complaints at this time.

## 2020-05-28 ENCOUNTER — APPOINTMENT (OUTPATIENT)
Dept: ORTHOPEDIC SURGERY | Facility: CLINIC | Age: 50
End: 2020-05-28
Payer: OTHER MISCELLANEOUS

## 2020-05-28 DIAGNOSIS — S52.121D DISPLACED FRACTURE OF HEAD OF RIGHT RADIUS, SUBSEQUENT ENCOUNTER FOR CLOSED FRACTURE WITH ROUTINE HEALING: ICD-10-CM

## 2020-05-28 DIAGNOSIS — S82.092D OTHER FRACTURE OF LEFT PATELLA, SUBSEQUENT ENCOUNTER FOR CLOSED FRACTURE WITH ROUTINE HEALING: ICD-10-CM

## 2020-05-28 DIAGNOSIS — S62.024D NONDISPLACED FRACTURE OF MIDDLE THIRD OF NAVICULAR [SCAPHOID] BONE OF RIGHT WRIST, SUBSEQUENT ENCOUNTER FOR FRACTURE WITH ROUTINE HEALING: ICD-10-CM

## 2020-05-28 PROCEDURE — 73560 X-RAY EXAM OF KNEE 1 OR 2: CPT | Mod: LT

## 2020-05-28 PROCEDURE — 99213 OFFICE O/P EST LOW 20 MIN: CPT

## 2020-05-28 PROCEDURE — 73080 X-RAY EXAM OF ELBOW: CPT | Mod: RT

## 2020-05-28 PROCEDURE — 73110 X-RAY EXAM OF WRIST: CPT | Mod: RT

## 2020-08-14 NOTE — PHYSICAL EXAM
[de-identified] : Patient is WDWN, alert, and in no acute distress. Breathing is unlabored. She is grossly oriented to person, place, and time. \par \par Right Elbow: 0-120 °. Full supination and pronation.\par \par Right Wrist: No tenderness over the anatomical snuffbox. \par \par Left Knee: Incision is healing. There are no signs of infection. The surgical incision site(s) was clean, dry and intact and not erythematous.  Minimal tenderness present. No edema. \par Range of Motion: Full flexion and extension.  [de-identified] : AP, lateral and oblique views of the right elbow were obtained today and revealed a nondisplaced radial head fracture. Fx is fully healed. \par \par AP, lateral and oblique views of the right wrist were obtained today and revealed a nondisplaced scaphoid fracture. Fx is fully healed. \par \par 2 views of the left knee were obtained and revealed a well-aligned patellar fracture with K-wire and figure-of-eight tension band wiring. Fx is fully healed. \par

## 2020-08-14 NOTE — DISCUSSION/SUMMARY
[de-identified] : Walking and physical activity were encouraged. \par Range of motion and strengthening exercises were reviewed. \par Topical analgesics as needed. \par NSAIDs as tolerated. \par Follow up in 6 months for repeat x-rays. \par \par Activity Restrictions: None. \par \par Patient has reached maximum medical improvement.She has 10% partial permanent scheduled loss of use in the left knee due to ORIF left patella.\par She has 5 partial permanent scheduled loss of use of the right elbow and wrist for the fractures .\par

## 2020-08-14 NOTE — HISTORY OF PRESENT ILLNESS
[de-identified] : Patient is a 49 year old female who returns for a followup visit after undergoing ORIF left patella at [facility]. Patient presented to the ER c/o left knee and right elbow pain after she fell at work. Xrays taken showed a displaced inferior pole patellar fracture and nondisplaced radial head fracture. She was advised surgery treatment for the patella. The surgery was on 06/19/2019. She states that additional xrays of the right wrist were done in the hospital and she was told that there was an additional fracture in the wrist. Xrays of the right wrist taken in this office on 07/01/2019 and revealed a nondisplaced scaphoid fracture. She was treated with a short arm cast. She also sustained a right elbow injury with xrays revealed a nondisplaced radial head fracture. She returns today at almost the one-year karyn. She reports minimal discomfort in the left knee which she describes as stiffness and mild soreness. She is uncomfortable to kneel directly onto the left knee and has not attempted to run out of caution. There is also a "clicking" sound whenever she repeatedly flexes and extends it, but is it not painful. Despite the discomfort, she does not have any pain. She is very active and walks every day. She is no longer using the cane. Prior to Covid-19, she was able to return to work and was commuting.\par Regarding the right elbow and wrist, she does not report any pain or new complaints at this time. There is still stiffness in the wrist, but it generally resolves after she starts to move it. \par The patient would like to close this WC case.

## 2022-11-03 NOTE — BEGINNING OF VISIT
PT HAS CHRONIC PAIN DISORDER  NORCO, GABAPENTIN  PT/OT TO INCREASE MOBILITY AND AMBULATION  10/27/2022 STABLE  11/02/2022 STABLE       [Patient] : patient

## 2023-02-14 NOTE — PROGRESS NOTE ADULT - ASSESSMENT
49yo F s/p L Patellla ORIF, now POD 0  FU AM Labs  Continue Periop ABx x24h  Pain Control  DVT PPx: SCDs, Lovenox, A81 starting POD 1  PT/OT: WBAT LLE in BJKI, NWB RUE in Sling/wrist splint  FU CT R Wrist  Rest, Ice, Elevate  Incentive Spirometry, OOB  D/c planning - FU SW Home vs Rehab
S/P ORIF Lt patella fracture        Rt radial head fracture(non-op)      Plan    ck labs  OOB/PT/WBAT LLE  NWB RUE  analgesic as needed       Jenna Pacheco PA-C   Beeper    [id number]
Resume regular home feeds
49 yo woman s/p fall presents with a left patella fx and a right radial fx. Patient is scheduled for a Open reduction and internal fixation of the right patella.

## 2025-04-17 ENCOUNTER — APPOINTMENT (OUTPATIENT)
Dept: RADIOLOGY | Facility: CLINIC | Age: 55
End: 2025-04-17
Payer: COMMERCIAL

## 2025-04-17 PROCEDURE — 77080 DXA BONE DENSITY AXIAL: CPT
